# Patient Record
Sex: MALE | Race: WHITE | NOT HISPANIC OR LATINO | Employment: FULL TIME | URBAN - METROPOLITAN AREA
[De-identification: names, ages, dates, MRNs, and addresses within clinical notes are randomized per-mention and may not be internally consistent; named-entity substitution may affect disease eponyms.]

---

## 2023-09-22 ENCOUNTER — HOSPITAL ENCOUNTER (OUTPATIENT)
Dept: DATA CONVERSION | Facility: HOSPITAL | Age: 48
Discharge: HOME | End: 2023-09-22
Payer: COMMERCIAL

## 2023-09-22 DIAGNOSIS — I21.02 ST ELEVATION (STEMI) MYOCARDIAL INFARCTION INVOLVING LEFT ANTERIOR DESCENDING CORONARY ARTERY (MULTI): ICD-10-CM

## 2023-09-22 DIAGNOSIS — Z95.5 PRESENCE OF CORONARY ANGIOPLASTY IMPLANT AND GRAFT: ICD-10-CM

## 2023-09-25 DIAGNOSIS — Z95.5 STENTED CORONARY ARTERY: Primary | ICD-10-CM

## 2023-09-25 DIAGNOSIS — I21.02 ST ELEVATION (STEMI) MYOCARDIAL INFARCTION INVOLVING LEFT ANTERIOR DESCENDING CORONARY ARTERY (MULTI): ICD-10-CM

## 2023-09-28 PROBLEM — E78.5 HYPERLIPIDEMIA: Status: ACTIVE | Noted: 2023-09-28

## 2023-09-28 PROBLEM — I10 ESSENTIAL HYPERTENSION: Status: ACTIVE | Noted: 2023-09-28

## 2023-09-28 PROBLEM — I25.10 CORONARY ARTERY DISEASE: Status: ACTIVE | Noted: 2023-09-28

## 2023-10-02 ENCOUNTER — APPOINTMENT (OUTPATIENT)
Dept: CARDIAC REHAB | Facility: CLINIC | Age: 48
End: 2023-10-02
Payer: COMMERCIAL

## 2023-10-02 ENCOUNTER — CLINICAL SUPPORT (OUTPATIENT)
Dept: CARDIAC REHAB | Facility: CLINIC | Age: 48
End: 2023-10-02
Payer: COMMERCIAL

## 2023-10-02 DIAGNOSIS — Z95.5 STENTED CORONARY ARTERY: ICD-10-CM

## 2023-10-02 DIAGNOSIS — I21.02 ST ELEVATION (STEMI) MYOCARDIAL INFARCTION INVOLVING LEFT ANTERIOR DESCENDING CORONARY ARTERY (MULTI): ICD-10-CM

## 2023-10-02 PROCEDURE — 93798 PHYS/QHP OP CAR RHAB W/ECG: CPT | Performed by: INTERNAL MEDICINE

## 2023-10-04 ENCOUNTER — TRANSCRIBE ORDERS (OUTPATIENT)
Dept: CARDIAC REHAB | Facility: CLINIC | Age: 48
End: 2023-10-04
Payer: COMMERCIAL

## 2023-10-04 ENCOUNTER — CLINICAL SUPPORT (OUTPATIENT)
Dept: CARDIAC REHAB | Facility: CLINIC | Age: 48
End: 2023-10-04
Payer: COMMERCIAL

## 2023-10-04 DIAGNOSIS — I21.01 ST ELEVATION MYOCARDIAL INFARCTION (STEMI) INVOLVING LEFT MAIN CORONARY ARTERY IN RECOVERY PHASE (MULTI): ICD-10-CM

## 2023-10-04 DIAGNOSIS — Z95.5 STENTED CORONARY ARTERY: ICD-10-CM

## 2023-10-04 PROCEDURE — 93798 PHYS/QHP OP CAR RHAB W/ECG: CPT | Performed by: INTERNAL MEDICINE

## 2023-10-06 ENCOUNTER — CLINICAL SUPPORT (OUTPATIENT)
Dept: CARDIAC REHAB | Facility: CLINIC | Age: 48
End: 2023-10-06
Payer: COMMERCIAL

## 2023-10-06 DIAGNOSIS — Z95.5 STENTED CORONARY ARTERY: ICD-10-CM

## 2023-10-06 DIAGNOSIS — I21.01 ST ELEVATION MYOCARDIAL INFARCTION (STEMI) INVOLVING LEFT MAIN CORONARY ARTERY IN RECOVERY PHASE (MULTI): ICD-10-CM

## 2023-10-06 PROCEDURE — 93798 PHYS/QHP OP CAR RHAB W/ECG: CPT | Performed by: INTERNAL MEDICINE

## 2023-10-09 ENCOUNTER — APPOINTMENT (OUTPATIENT)
Dept: CARDIAC REHAB | Facility: CLINIC | Age: 48
End: 2023-10-09
Payer: COMMERCIAL

## 2023-10-11 ENCOUNTER — CLINICAL SUPPORT (OUTPATIENT)
Dept: CARDIAC REHAB | Facility: CLINIC | Age: 48
End: 2023-10-11
Payer: COMMERCIAL

## 2023-10-11 DIAGNOSIS — Z95.5 STENTED CORONARY ARTERY: ICD-10-CM

## 2023-10-11 DIAGNOSIS — I21.01 ST ELEVATION MYOCARDIAL INFARCTION (STEMI) INVOLVING LEFT MAIN CORONARY ARTERY IN RECOVERY PHASE (MULTI): ICD-10-CM

## 2023-10-11 PROCEDURE — 93798 PHYS/QHP OP CAR RHAB W/ECG: CPT | Performed by: INTERNAL MEDICINE

## 2023-10-13 ENCOUNTER — CLINICAL SUPPORT (OUTPATIENT)
Dept: CARDIAC REHAB | Facility: CLINIC | Age: 48
End: 2023-10-13
Payer: COMMERCIAL

## 2023-10-13 DIAGNOSIS — Z95.5 STENTED CORONARY ARTERY: ICD-10-CM

## 2023-10-13 DIAGNOSIS — I21.01 ST ELEVATION MYOCARDIAL INFARCTION (STEMI) INVOLVING LEFT MAIN CORONARY ARTERY IN RECOVERY PHASE (MULTI): ICD-10-CM

## 2023-10-13 PROCEDURE — 93798 PHYS/QHP OP CAR RHAB W/ECG: CPT | Performed by: INTERNAL MEDICINE

## 2023-10-16 ENCOUNTER — CLINICAL SUPPORT (OUTPATIENT)
Dept: CARDIAC REHAB | Facility: CLINIC | Age: 48
End: 2023-10-16
Payer: COMMERCIAL

## 2023-10-16 DIAGNOSIS — I21.01 ST ELEVATION MYOCARDIAL INFARCTION (STEMI) INVOLVING LEFT MAIN CORONARY ARTERY IN RECOVERY PHASE (MULTI): ICD-10-CM

## 2023-10-16 DIAGNOSIS — Z95.5 STENTED CORONARY ARTERY: ICD-10-CM

## 2023-10-16 NOTE — PROGRESS NOTES
Cardiac Rehabilitation Initial Assessment (iITP)    Name: Henrry Burdick  Medical Record Number: [id number]  YOB: 1975    Today’s Date: 10/16/2023  Primary Care Physician: Henrry Duncan DO  Referring Physician: ***    General  1. ST elevation myocardial infarction (STEMI) involving left main coronary artery in recovery phase (CMS/HCC)  Follow Up In Cardiac Rehab      2. Stented coronary artery  Follow Up In Cardiac Rehab          AACVPR Risk Stratification:{AACVPR Risk Stratification:86307}    Medical History  No past medical history on file.  No past surgical history on file.  Allergies: Not on File    Psychosocial Assessment  Patient is returning for a follow up visit after reporting minimal to mild depressive symptoms.     Please document a new PHQ-9 score.    Pt reported/currently experiencing: {Yes/No:28362}  Currently seeing a mental health provider: {Yes/No:48773}  Social Support: {Yes/No:65473}    Learning assessment/barriers: {Assessment/barriers:68288}  Preferred learning method: {Preferred learning method:14955}   Quality of Life Survey:{Quality of Life Survey:40145}    Educational Assessment:  Cardiac Knowledge Test: ***/15    Stages of Change:{Stages of change:72498}    Psychosocial Goals: ***  Psychosocial Interventions/Education: ***        Nutrition Assessment:    Hyperlipidemia: {Yes/No:36043} ***    Lipids: ***  Lipid Draw Date: ***    Current Dietary Guidelines:{Dietary Guidelines:14107}  Barriers to dietary change: {Yes/No:62036} ***    Diet Habit Survey score: ***    Diabetes Assessment    Diabetes:{Yes or No:46419}   Medication: {Medication:84418}  Last Hemoglobin A1C: ***   Last Hemoglobin A1C date: ***  Pt monitors BS at home: {Yes/No:30729}  Frequency: ***  FBS range: ***  Hypoglycemic Episodes: {Yes/No:89060}    Weight Management:    There were no vitals filed for this visit.  There is no height or weight on file to calculate BMI.    Nutrition Goals: ***  Nutrition  Interventions/Education: ***    Exercise Assessment:  Current Home Exercise: {Yes/No:33323}  Mode: ***  Days/Week: ***  Min/Day: ***    Exercise Prescription:    Based on: {{Exercise Prescription:40981}   Frequency:  *** days/week   Mode: {Mode:95439}   Duration: *** total aerobic minutes   Intensity: RPE ***       Target HR ***       METS ***       SpO2 Range *** %       O2 Flow Rate *** L/min     Modality METS Load Duration   1 Pre-Exercise *** *** ***   2 Treadmill *** *** *** ***   3 Nustep 4000 *** *** *** ***   4 Recumbent Cycle *** *** *** ***   5 Weights *** *** *** ***   6 Post-Exercise *** *** ***     Exercise Goals: ***  Exercise Interventions/Education: ***    Other Core Components/Risk Factor Assessment:    Medication adherence:   Current Medications:   Current Outpatient Medications   Medication Sig Dispense Refill    aspirin 81 mg EC tablet Take 1 tablet (81 mg) by mouth once daily.      atorvastatin (Lipitor) 80 mg tablet Take 1 tablet (80 mg) by mouth once daily.      lisinopril 5 mg tablet Take 1 tablet (5 mg) by mouth once daily.      metoprolol succinate XL (Toprol-XL) 25 mg 24 hr tablet Take 1 tablet (25 mg) by mouth once daily.      nitroglycerin (Nitrostat) 0.4 mg SL tablet Place 1 tablet (0.4 mg) under the tongue every 5 minutes if needed for chest pain.      nitroglycerin (Nitrostat) 0.4 mg SL tablet DISSOLVE 1 TABLET UNDER THE TONGUE EVERY 5 MINUTES UP TO 3 DOSES AS NEEDED FOR CHEST PAIN. IF STILL NO RELIEF, CALL 911 25 tablet 0    ticagrelor (Brilinta) 90 mg tablet Take 1 tablet (90 mg) by mouth 2 times a day.       No current facility-administered medications for this visit.                 Taking medications as prescribed: {Yes/No:89151}   If no, why: ***   Uses pill box/organizer: {Yes/No:88331}   Carries medication list: {Yes/No:05724}    Blood Pressure Management:  Hx of Hypertension: {Yes/No:66775}  Resting BP: Growth %ile SmartLinks can only be used for patients less than 20 years  old.     Heart Failure Management:  Hx of Heart Failure:{Yes/No:52166}  Type (selection): {Type (selection):79534}  Most recent EF: @LVEF%@  Date:***  Onset of heart failure diagnosis: ***  Last heart failure hospitalization: ***  Number of HF admissions per year: ***  Symptoms:{Symptoms:11156}  Is there a family Hx of HF:{Yes/No:91434}  Does patient obtain daily weight {Yes/No:85887}    Smoking/Tobacco Assessment:    Tobacco Use: Not on file       Other Core Component Goals: ***  Other Core Component Interventions/Education: ***       Individual Patient Goals:  ***      [facility]ha[facility] Staff Signature: Inga Troy MD Review Signature: ***

## 2023-10-18 ENCOUNTER — CLINICAL SUPPORT (OUTPATIENT)
Dept: CARDIAC REHAB | Facility: CLINIC | Age: 48
End: 2023-10-18
Payer: COMMERCIAL

## 2023-10-18 DIAGNOSIS — I21.01 ST ELEVATION MYOCARDIAL INFARCTION (STEMI) INVOLVING LEFT MAIN CORONARY ARTERY IN RECOVERY PHASE (MULTI): ICD-10-CM

## 2023-10-18 DIAGNOSIS — Z95.5 STENTED CORONARY ARTERY: ICD-10-CM

## 2023-10-18 PROCEDURE — 93798 PHYS/QHP OP CAR RHAB W/ECG: CPT | Performed by: INTERNAL MEDICINE

## 2023-10-20 ENCOUNTER — CLINICAL SUPPORT (OUTPATIENT)
Dept: CARDIAC REHAB | Facility: CLINIC | Age: 48
End: 2023-10-20
Payer: COMMERCIAL

## 2023-10-20 DIAGNOSIS — I21.01 ST ELEVATION MYOCARDIAL INFARCTION (STEMI) INVOLVING LEFT MAIN CORONARY ARTERY IN RECOVERY PHASE (MULTI): ICD-10-CM

## 2023-10-20 DIAGNOSIS — Z95.5 STENTED CORONARY ARTERY: ICD-10-CM

## 2023-10-20 PROCEDURE — 93798 PHYS/QHP OP CAR RHAB W/ECG: CPT | Performed by: INTERNAL MEDICINE

## 2023-10-23 ENCOUNTER — CLINICAL SUPPORT (OUTPATIENT)
Dept: CARDIAC REHAB | Facility: CLINIC | Age: 48
End: 2023-10-23
Payer: COMMERCIAL

## 2023-10-23 DIAGNOSIS — I21.01 ST ELEVATION MYOCARDIAL INFARCTION (STEMI) INVOLVING LEFT MAIN CORONARY ARTERY IN RECOVERY PHASE (MULTI): ICD-10-CM

## 2023-10-23 DIAGNOSIS — Z95.5 STENTED CORONARY ARTERY: ICD-10-CM

## 2023-10-23 PROCEDURE — 93798 PHYS/QHP OP CAR RHAB W/ECG: CPT | Performed by: INTERNAL MEDICINE

## 2023-10-25 ENCOUNTER — CLINICAL SUPPORT (OUTPATIENT)
Dept: CARDIAC REHAB | Facility: CLINIC | Age: 48
End: 2023-10-25
Payer: COMMERCIAL

## 2023-10-25 DIAGNOSIS — Z95.5 STENTED CORONARY ARTERY: ICD-10-CM

## 2023-10-25 DIAGNOSIS — I21.01 ST ELEVATION MYOCARDIAL INFARCTION (STEMI) INVOLVING LEFT MAIN CORONARY ARTERY IN RECOVERY PHASE (MULTI): ICD-10-CM

## 2023-10-25 PROCEDURE — 93798 PHYS/QHP OP CAR RHAB W/ECG: CPT | Performed by: INTERNAL MEDICINE

## 2023-10-27 ENCOUNTER — CLINICAL SUPPORT (OUTPATIENT)
Dept: CARDIAC REHAB | Facility: CLINIC | Age: 48
End: 2023-10-27
Payer: COMMERCIAL

## 2023-10-27 DIAGNOSIS — I21.01 ST ELEVATION MYOCARDIAL INFARCTION (STEMI) INVOLVING LEFT MAIN CORONARY ARTERY IN RECOVERY PHASE (MULTI): ICD-10-CM

## 2023-10-27 DIAGNOSIS — Z95.5 STENTED CORONARY ARTERY: ICD-10-CM

## 2023-10-28 PROBLEM — Z78.9 MEDICAL HOME PATIENT: Status: ACTIVE | Noted: 2023-10-28

## 2023-10-28 PROBLEM — E34.9 HYPOTESTOSTERONISM: Status: ACTIVE | Noted: 2023-10-28

## 2023-10-28 PROBLEM — E55.9 VITAMIN D DEFICIENCY: Status: ACTIVE | Noted: 2017-09-09

## 2023-10-28 PROBLEM — I21.9 ACUTE MI (MULTI): Status: ACTIVE | Noted: 2023-10-28

## 2023-10-28 PROBLEM — F32.A DEPRESSION: Status: ACTIVE | Noted: 2023-10-28

## 2023-10-28 PROBLEM — F43.21 GRIEF: Status: ACTIVE | Noted: 2023-10-28

## 2023-10-28 PROBLEM — Q74.0 THUMB ANOMALY: Status: ACTIVE | Noted: 2023-10-28

## 2023-10-28 PROBLEM — N52.9 ERECTILE DYSFUNCTION: Status: ACTIVE | Noted: 2023-10-28

## 2023-10-28 PROBLEM — F41.9 ANXIETY: Status: ACTIVE | Noted: 2023-10-28

## 2023-10-28 PROBLEM — G47.00 INSOMNIA: Status: ACTIVE | Noted: 2023-10-28

## 2023-10-28 PROBLEM — S61.219A LACERATION OF FINGER: Status: ACTIVE | Noted: 2023-10-28

## 2023-10-28 PROBLEM — I20.9 ANGINA PECTORIS (CMS-HCC): Status: ACTIVE | Noted: 2023-10-28

## 2023-10-28 PROBLEM — Z91.89 AT RISK FOR BLEEDING: Status: ACTIVE | Noted: 2023-10-28

## 2023-10-28 PROBLEM — M67.90 DISORDER OF SYNOVIUM: Status: ACTIVE | Noted: 2023-10-28

## 2023-10-28 RX ORDER — DESVENLAFAXINE 50 MG/1
50 TABLET, EXTENDED RELEASE ORAL DAILY
COMMUNITY
Start: 2021-02-23 | End: 2023-10-31 | Stop reason: ALTCHOICE

## 2023-10-28 RX ORDER — ALBUTEROL SULFATE 90 UG/1
2 AEROSOL, METERED RESPIRATORY (INHALATION) EVERY 6 HOURS PRN
COMMUNITY
End: 2023-10-31 | Stop reason: ALTCHOICE

## 2023-10-28 RX ORDER — LORAZEPAM 0.5 MG/1
0.5 TABLET ORAL DAILY
COMMUNITY
Start: 2021-03-23 | End: 2023-10-31 | Stop reason: ALTCHOICE

## 2023-10-30 ENCOUNTER — CLINICAL SUPPORT (OUTPATIENT)
Dept: CARDIAC REHAB | Facility: CLINIC | Age: 48
End: 2023-10-30
Payer: COMMERCIAL

## 2023-10-30 DIAGNOSIS — I21.01 ST ELEVATION MYOCARDIAL INFARCTION (STEMI) INVOLVING LEFT MAIN CORONARY ARTERY IN RECOVERY PHASE (MULTI): ICD-10-CM

## 2023-10-30 DIAGNOSIS — Z95.5 STENTED CORONARY ARTERY: ICD-10-CM

## 2023-10-30 PROCEDURE — 93798 PHYS/QHP OP CAR RHAB W/ECG: CPT | Performed by: INTERNAL MEDICINE

## 2023-10-30 RX ORDER — NAPROXEN SODIUM 220 MG/1
81 TABLET, FILM COATED ORAL DAILY
COMMUNITY
Start: 2023-10-28 | End: 2023-10-31 | Stop reason: SDUPTHER

## 2023-10-31 ENCOUNTER — OFFICE VISIT (OUTPATIENT)
Dept: CARDIOLOGY | Facility: CLINIC | Age: 48
End: 2023-10-31
Payer: COMMERCIAL

## 2023-10-31 VITALS
BODY MASS INDEX: 32.99 KG/M2 | RESPIRATION RATE: 18 BRPM | SYSTOLIC BLOOD PRESSURE: 112 MMHG | OXYGEN SATURATION: 98 % | DIASTOLIC BLOOD PRESSURE: 63 MMHG | HEART RATE: 68 BPM | HEIGHT: 65 IN | WEIGHT: 198 LBS

## 2023-10-31 DIAGNOSIS — E78.2 MIXED HYPERLIPIDEMIA: ICD-10-CM

## 2023-10-31 DIAGNOSIS — I10 PRIMARY HYPERTENSION: ICD-10-CM

## 2023-10-31 DIAGNOSIS — I50.20 SYSTOLIC CONGESTIVE HEART FAILURE, UNSPECIFIED HF CHRONICITY (MULTI): ICD-10-CM

## 2023-10-31 DIAGNOSIS — I20.9 ANGINA PECTORIS (CMS-HCC): Primary | ICD-10-CM

## 2023-10-31 PROCEDURE — 93000 ELECTROCARDIOGRAM COMPLETE: CPT | Performed by: INTERNAL MEDICINE

## 2023-10-31 PROCEDURE — 99214 OFFICE O/P EST MOD 30 MIN: CPT | Performed by: INTERNAL MEDICINE

## 2023-10-31 PROCEDURE — 3074F SYST BP LT 130 MM HG: CPT | Performed by: INTERNAL MEDICINE

## 2023-10-31 PROCEDURE — 3078F DIAST BP <80 MM HG: CPT | Performed by: INTERNAL MEDICINE

## 2023-10-31 RX ORDER — HYDROGEN PEROXIDE 3 %
20 SOLUTION, NON-ORAL MISCELLANEOUS DAILY PRN
COMMUNITY

## 2023-10-31 RX ORDER — LOSARTAN POTASSIUM 25 MG/1
25 TABLET ORAL DAILY
Qty: 30 TABLET | Refills: 11 | Status: SHIPPED | OUTPATIENT
Start: 2023-10-31 | End: 2024-10-30

## 2023-10-31 RX ORDER — OMEPRAZOLE 40 MG/1
40 CAPSULE, DELAYED RELEASE ORAL DAILY PRN
COMMUNITY
End: 2024-01-24 | Stop reason: SDUPTHER

## 2023-10-31 RX ORDER — LISINOPRIL 5 MG/1
5 TABLET ORAL DAILY
COMMUNITY
End: 2023-10-31

## 2023-10-31 RX ORDER — ROSUVASTATIN CALCIUM 20 MG/1
20 TABLET, COATED ORAL DAILY
Qty: 90 TABLET | Refills: 3 | Status: SHIPPED | OUTPATIENT
Start: 2023-10-31 | End: 2024-10-30

## 2023-10-31 RX ORDER — METOPROLOL TARTRATE 25 MG/1
12.5 TABLET, FILM COATED ORAL 2 TIMES DAILY
COMMUNITY

## 2023-10-31 ASSESSMENT — PATIENT HEALTH QUESTIONNAIRE - PHQ9
1. LITTLE INTEREST OR PLEASURE IN DOING THINGS: NOT AT ALL
2. FEELING DOWN, DEPRESSED OR HOPELESS: NOT AT ALL
SUM OF ALL RESPONSES TO PHQ9 QUESTIONS 1 AND 2: 0

## 2023-10-31 ASSESSMENT — PAIN SCALES - GENERAL: PAINLEVEL: 0-NO PAIN

## 2023-10-31 NOTE — PROGRESS NOTES
History of present illness:    This is a very pleasant 48-year-old male returns to my office after hospitalization for anterior STEMI status post PCI to % occluded with excellent result in August 2023. EF of 35 to 40%.  Patient returns to my office for follow-up.  For the last month or so has been feeling tired overall no chest pain or shortness of breath with activity but tired around 2 or 3 PM he is tired at home he needs to take naps.  Having very infrequent episodes of lower extremity edema very mild.  Tolerating rehab good.     Past Medical History:   Diagnosis Date    Acute MI (CMS/[facility]) 10/28/2023    Angina pectoris (CMS/[facility]) 10/28/2023    Coronary artery disease 09/28/2023    Essential hypertension 09/28/2023    Hyperlipidemia 09/28/2023    S/P cardiac cath     with [employer] [place] [facility] Eliting stent       Past Surgical History:   Procedure Laterality Date    CORONARY ANGIOPLASTY WITH STENT PLACEMENT  07/2023       No Known Allergies     reports that he has been smoking cigarettes. He has been smoking an average of .5 packs per day. He has been exposed to tobacco smoke. He has never used smokeless tobacco. He reports current alcohol use. He reports that he does not use drugs.    Family History   Problem Relation Name Age of Onset    Hypertension Mother      Heart disease Father      Heart attack Father         Patient's Medications   New Prescriptions    No medications on file   Previous Medications    ASPIRIN 81 MG EC TABLET    Take 1 tablet (81 mg) by mouth once daily.    ATORVASTATIN (LIPITOR) 80 MG TABLET    Take 1 tablet (80 mg) by mouth once daily.    ESOMEPRAZOLE (NEXIUM) 20 MG DR CAPSULE    Take 1 capsule (20 mg) by mouth once daily as needed. Do not open capsule.    LISINOPRIL 5 MG TABLET    Take 1 tablet (5 mg) by mouth once daily.    METOPROLOL TARTRATE (LOPRESSOR) 25 MG TABLET    Take 0.5 tablets (12.5 mg) by mouth 2 times a day.    NITROGLYCERIN (NITROSTAT) 0.4 MG SL TABLET     DISSOLVE 1 TABLET UNDER THE TONGUE EVERY 5 MINUTES UP TO 3 DOSES AS NEEDED FOR CHEST PAIN. IF STILL NO RELIEF, CALL 911    OMEPRAZOLE (PRILOSEC) 40 MG DR CAPSULE    Take 1 capsule (40 mg) by mouth once daily as needed. Do not crush or chew.    TICAGRELOR (BRILINTA) 90 MG TABLET    Take 1 tablet (90 mg) by mouth 2 times a day.   Modified Medications    No medications on file   Discontinued Medications    ALBUTEROL 90 MCG/ACTUATION INHALER    Inhale 2 puffs every 6 hours if needed.    ASPIRIN 81 MG CHEWABLE TABLET    Chew 1 tablet (81 mg) once daily.    DESVENLAFAXINE 50 MG 24 HR TABLET    Take 1 tablet (50 mg) by mouth once daily.    LISINOPRIL 5 MG TABLET    Take 1 tablet (5 mg) by mouth once daily.    LORAZEPAM (ATIVAN) 0.5 MG TABLET    Take 1 tablet (0.5 mg) by mouth once daily.    METOPROLOL SUCCINATE XL (TOPROL-XL) 25 MG 24 HR TABLET    Take 1 tablet (25 mg) by mouth once daily.    NITROGLYCERIN (NITROSTAT) 0.4 MG SL TABLET    Place 1 tablet (0.4 mg) under the tongue every 5 minutes if needed for chest pain.       Objective   Physical Exam  General: Patient in no acute distress   HEENT: Atraumatic normocephalic.  Neck: Supple, jugular venous pressure within normal limit.  No bruits  Lungs: Clear to auscultation bilaterally  Cardiovascular: Regular rate and rhythm, normal heart sounds, no murmurs rubs or gallops  Abdomen: Soft nontender nondistended.  Normal bowel sounds.  Extremities: Warm to touch, no edema.      Lab Review   No visits with results within 2 Month(s) from this visit.   Latest known visit with results is:   No results found for any previous visit.        Assessment/Plan   Patient Active Problem List   Diagnosis    Coronary artery disease    Essential hypertension    Hyperlipidemia    At risk for bleeding    Medical home patient    Angina pectoris (CMS/HCC)    Acute MI (CMS/HCC)    Anxiety    Depression    Disorder of synovium    Erectile dysfunction    Grief    Hypotestosteronism    Insomnia     Laceration of finger    Thumb anomaly    Vitamin D deficiency      This is a very pleasant 48-year-old male returns to my office after hospitalization for anterior STEMI status post PCI to % occluded with excellent result in August 2023. EF of 35 to 40%.  Patient returns to my office for follow-up.  For the last month or so has been feeling tired overall no chest pain or shortness of breath with activity but tired around 2 or 3 PM he is tired at home he needs to take naps.  Having very infrequent episodes of lower extremity edema very mild.  Tolerating rehab good.  At this point I will make some adjustments to his medications.  He has been having diarrhea with atorvastatin we will change it to rosuvastatin 20 mg oral daily.  We will stop lisinopril due to cough and put him on losartan 25 mg oral daily.  We will obtain 2D echo and blood work-up as work-up for fatigue shortness of breath.  Will make sure his ejection fraction is stable.  EKG today showed normal sinus rhythm no QRS voltage.  We will follow-up in 2 months.    Virgilio Miner MD

## 2023-11-03 ENCOUNTER — CLINICAL SUPPORT (OUTPATIENT)
Dept: CARDIAC REHAB | Facility: CLINIC | Age: 48
End: 2023-11-03
Payer: COMMERCIAL

## 2023-11-03 DIAGNOSIS — I21.01 ST ELEVATION MYOCARDIAL INFARCTION (STEMI) INVOLVING LEFT MAIN CORONARY ARTERY IN RECOVERY PHASE (MULTI): ICD-10-CM

## 2023-11-03 DIAGNOSIS — Z95.5 STENTED CORONARY ARTERY: ICD-10-CM

## 2023-11-06 ENCOUNTER — CLINICAL SUPPORT (OUTPATIENT)
Dept: CARDIAC REHAB | Facility: CLINIC | Age: 48
End: 2023-11-06
Payer: COMMERCIAL

## 2023-11-06 DIAGNOSIS — I21.01 ST ELEVATION MYOCARDIAL INFARCTION (STEMI) INVOLVING LEFT MAIN CORONARY ARTERY IN RECOVERY PHASE (MULTI): ICD-10-CM

## 2023-11-06 DIAGNOSIS — Z95.5 STENTED CORONARY ARTERY: ICD-10-CM

## 2023-11-08 ENCOUNTER — CLINICAL SUPPORT (OUTPATIENT)
Dept: CARDIAC REHAB | Facility: CLINIC | Age: 48
End: 2023-11-08
Payer: COMMERCIAL

## 2023-11-08 DIAGNOSIS — I21.01 ST ELEVATION MYOCARDIAL INFARCTION (STEMI) INVOLVING LEFT MAIN CORONARY ARTERY IN RECOVERY PHASE (MULTI): ICD-10-CM

## 2023-11-08 DIAGNOSIS — Z95.5 STENTED CORONARY ARTERY: ICD-10-CM

## 2023-11-10 ENCOUNTER — APPOINTMENT (OUTPATIENT)
Dept: CARDIAC REHAB | Facility: CLINIC | Age: 48
End: 2023-11-10
Payer: COMMERCIAL

## 2023-11-13 ENCOUNTER — CLINICAL SUPPORT (OUTPATIENT)
Dept: CARDIAC REHAB | Facility: CLINIC | Age: 48
End: 2023-11-13
Payer: COMMERCIAL

## 2023-11-13 DIAGNOSIS — I21.01 ST ELEVATION MYOCARDIAL INFARCTION (STEMI) INVOLVING LEFT MAIN CORONARY ARTERY IN RECOVERY PHASE (MULTI): ICD-10-CM

## 2023-11-13 DIAGNOSIS — Z95.5 STENTED CORONARY ARTERY: ICD-10-CM

## 2023-11-15 ENCOUNTER — APPOINTMENT (OUTPATIENT)
Dept: CARDIAC REHAB | Facility: CLINIC | Age: 48
End: 2023-11-15
Payer: COMMERCIAL

## 2023-11-17 ENCOUNTER — APPOINTMENT (OUTPATIENT)
Dept: CARDIAC REHAB | Facility: CLINIC | Age: 48
End: 2023-11-17
Payer: COMMERCIAL

## 2023-11-20 ENCOUNTER — CLINICAL SUPPORT (OUTPATIENT)
Dept: CARDIAC REHAB | Facility: CLINIC | Age: 48
End: 2023-11-20
Payer: COMMERCIAL

## 2023-11-20 DIAGNOSIS — Z95.5 STENTED CORONARY ARTERY: ICD-10-CM

## 2023-11-20 DIAGNOSIS — I21.01 ST ELEVATION MYOCARDIAL INFARCTION (STEMI) INVOLVING LEFT MAIN CORONARY ARTERY IN RECOVERY PHASE (MULTI): ICD-10-CM

## 2023-11-22 ENCOUNTER — APPOINTMENT (OUTPATIENT)
Dept: CARDIAC REHAB | Facility: CLINIC | Age: 48
End: 2023-11-22
Payer: COMMERCIAL

## 2023-11-24 ENCOUNTER — APPOINTMENT (OUTPATIENT)
Dept: CARDIAC REHAB | Facility: CLINIC | Age: 48
End: 2023-11-24
Payer: COMMERCIAL

## 2023-11-27 ENCOUNTER — CLINICAL SUPPORT (OUTPATIENT)
Dept: CARDIAC REHAB | Facility: CLINIC | Age: 48
End: 2023-11-27
Payer: COMMERCIAL

## 2023-11-27 DIAGNOSIS — I21.01 ST ELEVATION MYOCARDIAL INFARCTION (STEMI) INVOLVING LEFT MAIN CORONARY ARTERY IN RECOVERY PHASE (MULTI): ICD-10-CM

## 2023-11-27 DIAGNOSIS — Z95.5 STENTED CORONARY ARTERY: ICD-10-CM

## 2023-11-27 PROCEDURE — 93798 PHYS/QHP OP CAR RHAB W/ECG: CPT | Performed by: INTERNAL MEDICINE

## 2023-11-29 ENCOUNTER — CLINICAL SUPPORT (OUTPATIENT)
Dept: CARDIAC REHAB | Facility: CLINIC | Age: 48
End: 2023-11-29
Payer: COMMERCIAL

## 2023-11-29 DIAGNOSIS — Z95.5 STENTED CORONARY ARTERY: ICD-10-CM

## 2023-11-29 DIAGNOSIS — I21.01 ST ELEVATION MYOCARDIAL INFARCTION (STEMI) INVOLVING LEFT MAIN CORONARY ARTERY IN RECOVERY PHASE (MULTI): ICD-10-CM

## 2023-11-29 PROCEDURE — 93798 PHYS/QHP OP CAR RHAB W/ECG: CPT | Performed by: INTERNAL MEDICINE

## 2023-12-01 ENCOUNTER — CLINICAL SUPPORT (OUTPATIENT)
Dept: CARDIAC REHAB | Facility: CLINIC | Age: 48
End: 2023-12-01
Payer: COMMERCIAL

## 2023-12-01 DIAGNOSIS — I21.01 ST ELEVATION MYOCARDIAL INFARCTION (STEMI) INVOLVING LEFT MAIN CORONARY ARTERY IN RECOVERY PHASE (MULTI): ICD-10-CM

## 2023-12-01 DIAGNOSIS — Z95.5 STENTED CORONARY ARTERY: ICD-10-CM

## 2023-12-04 ENCOUNTER — CLINICAL SUPPORT (OUTPATIENT)
Dept: CARDIAC REHAB | Facility: CLINIC | Age: 48
End: 2023-12-04
Payer: COMMERCIAL

## 2023-12-04 DIAGNOSIS — Z95.5 STENTED CORONARY ARTERY: ICD-10-CM

## 2023-12-04 DIAGNOSIS — I21.01 ST ELEVATION MYOCARDIAL INFARCTION (STEMI) INVOLVING LEFT MAIN CORONARY ARTERY IN RECOVERY PHASE (MULTI): ICD-10-CM

## 2023-12-04 PROCEDURE — 93798 PHYS/QHP OP CAR RHAB W/ECG: CPT | Performed by: INTERNAL MEDICINE

## 2023-12-06 ENCOUNTER — CLINICAL SUPPORT (OUTPATIENT)
Dept: CARDIAC REHAB | Facility: CLINIC | Age: 48
End: 2023-12-06
Payer: COMMERCIAL

## 2023-12-06 DIAGNOSIS — Z95.5 STENTED CORONARY ARTERY: ICD-10-CM

## 2023-12-06 DIAGNOSIS — I21.01 ST ELEVATION MYOCARDIAL INFARCTION (STEMI) INVOLVING LEFT MAIN CORONARY ARTERY IN RECOVERY PHASE (MULTI): ICD-10-CM

## 2023-12-08 ENCOUNTER — CLINICAL SUPPORT (OUTPATIENT)
Dept: CARDIAC REHAB | Facility: CLINIC | Age: 48
End: 2023-12-08
Payer: COMMERCIAL

## 2023-12-08 DIAGNOSIS — I21.01 ST ELEVATION MYOCARDIAL INFARCTION (STEMI) INVOLVING LEFT MAIN CORONARY ARTERY IN RECOVERY PHASE (MULTI): ICD-10-CM

## 2023-12-08 DIAGNOSIS — Z95.5 STENTED CORONARY ARTERY: ICD-10-CM

## 2023-12-11 ENCOUNTER — CLINICAL SUPPORT (OUTPATIENT)
Dept: CARDIAC REHAB | Facility: CLINIC | Age: 48
End: 2023-12-11
Payer: COMMERCIAL

## 2023-12-11 DIAGNOSIS — I21.01 ST ELEVATION MYOCARDIAL INFARCTION (STEMI) INVOLVING LEFT MAIN CORONARY ARTERY IN RECOVERY PHASE (MULTI): ICD-10-CM

## 2023-12-11 DIAGNOSIS — Z95.5 STENTED CORONARY ARTERY: ICD-10-CM

## 2023-12-11 PROCEDURE — 93798 PHYS/QHP OP CAR RHAB W/ECG: CPT | Performed by: INTERNAL MEDICINE

## 2023-12-13 ENCOUNTER — CLINICAL SUPPORT (OUTPATIENT)
Dept: CARDIAC REHAB | Facility: CLINIC | Age: 48
End: 2023-12-13
Payer: COMMERCIAL

## 2023-12-13 DIAGNOSIS — I21.01 ST ELEVATION MYOCARDIAL INFARCTION (STEMI) INVOLVING LEFT MAIN CORONARY ARTERY IN RECOVERY PHASE (MULTI): ICD-10-CM

## 2023-12-13 DIAGNOSIS — Z95.5 STENTED CORONARY ARTERY: ICD-10-CM

## 2023-12-13 PROCEDURE — 93798 PHYS/QHP OP CAR RHAB W/ECG: CPT | Performed by: INTERNAL MEDICINE

## 2023-12-18 ENCOUNTER — APPOINTMENT (OUTPATIENT)
Dept: CARDIAC REHAB | Facility: CLINIC | Age: 48
End: 2023-12-18
Payer: COMMERCIAL

## 2023-12-20 ENCOUNTER — APPOINTMENT (OUTPATIENT)
Dept: CARDIAC REHAB | Facility: CLINIC | Age: 48
End: 2023-12-20
Payer: COMMERCIAL

## 2023-12-22 ENCOUNTER — APPOINTMENT (OUTPATIENT)
Dept: CARDIAC REHAB | Facility: CLINIC | Age: 48
End: 2023-12-22
Payer: COMMERCIAL

## 2023-12-26 RX ORDER — METOPROLOL SUCCINATE 25 MG/1
TABLET, EXTENDED RELEASE ORAL
COMMUNITY
Start: 2023-12-14 | End: 2024-01-24 | Stop reason: SDUPTHER

## 2023-12-27 ENCOUNTER — APPOINTMENT (OUTPATIENT)
Dept: CARDIAC REHAB | Facility: CLINIC | Age: 48
End: 2023-12-27
Payer: COMMERCIAL

## 2023-12-29 ENCOUNTER — APPOINTMENT (OUTPATIENT)
Dept: CARDIAC REHAB | Facility: CLINIC | Age: 48
End: 2023-12-29
Payer: COMMERCIAL

## 2024-01-03 ENCOUNTER — APPOINTMENT (OUTPATIENT)
Dept: CARDIAC REHAB | Facility: CLINIC | Age: 49
End: 2024-01-03
Payer: COMMERCIAL

## 2024-01-05 ENCOUNTER — APPOINTMENT (OUTPATIENT)
Dept: CARDIAC REHAB | Facility: CLINIC | Age: 49
End: 2024-01-05
Payer: COMMERCIAL

## 2024-01-08 ENCOUNTER — APPOINTMENT (OUTPATIENT)
Dept: CARDIAC REHAB | Facility: CLINIC | Age: 49
End: 2024-01-08
Payer: COMMERCIAL

## 2024-01-09 ENCOUNTER — APPOINTMENT (OUTPATIENT)
Dept: CARDIOLOGY | Facility: CLINIC | Age: 49
End: 2024-01-09
Payer: COMMERCIAL

## 2024-01-24 ENCOUNTER — OFFICE VISIT (OUTPATIENT)
Dept: CARDIOLOGY | Facility: CLINIC | Age: 49
End: 2024-01-24
Payer: COMMERCIAL

## 2024-01-24 VITALS
TEMPERATURE: 98.9 F | BODY MASS INDEX: 34.99 KG/M2 | WEIGHT: 210 LBS | HEART RATE: 73 BPM | SYSTOLIC BLOOD PRESSURE: 108 MMHG | HEIGHT: 65 IN | RESPIRATION RATE: 18 BRPM | DIASTOLIC BLOOD PRESSURE: 94 MMHG | OXYGEN SATURATION: 96 %

## 2024-01-24 DIAGNOSIS — E78.2 MIXED HYPERLIPIDEMIA: ICD-10-CM

## 2024-01-24 DIAGNOSIS — I25.10 CORONARY ARTERY DISEASE INVOLVING NATIVE CORONARY ARTERY OF NATIVE HEART WITHOUT ANGINA PECTORIS: Primary | ICD-10-CM

## 2024-01-24 DIAGNOSIS — I10 ESSENTIAL HYPERTENSION: ICD-10-CM

## 2024-01-24 DIAGNOSIS — I20.9 ANGINA PECTORIS (CMS-HCC): ICD-10-CM

## 2024-01-24 PROCEDURE — 3074F SYST BP LT 130 MM HG: CPT | Performed by: INTERNAL MEDICINE

## 2024-01-24 PROCEDURE — 99214 OFFICE O/P EST MOD 30 MIN: CPT | Performed by: INTERNAL MEDICINE

## 2024-01-24 PROCEDURE — 3080F DIAST BP >= 90 MM HG: CPT | Performed by: INTERNAL MEDICINE

## 2024-01-24 ASSESSMENT — PATIENT HEALTH QUESTIONNAIRE - PHQ9
3. TROUBLE FALLING OR STAYING ASLEEP OR SLEEPING TOO MUCH: NOT AT ALL
9. THOUGHTS THAT YOU WOULD BE BETTER OFF DEAD, OR OF HURTING YOURSELF: NOT AT ALL
4. FEELING TIRED OR HAVING LITTLE ENERGY: NEARLY EVERY DAY
1. LITTLE INTEREST OR PLEASURE IN DOING THINGS: NEARLY EVERY DAY
10. IF YOU CHECKED OFF ANY PROBLEMS, HOW DIFFICULT HAVE THESE PROBLEMS MADE IT FOR YOU TO DO YOUR WORK, TAKE CARE OF THINGS AT HOME, OR GET ALONG WITH OTHER PEOPLE: EXTREMELY DIFFICULT
7. TROUBLE CONCENTRATING ON THINGS, SUCH AS READING THE NEWSPAPER OR WATCHING TELEVISION: NOT AT ALL
2. FEELING DOWN, DEPRESSED OR HOPELESS: NEARLY EVERY DAY
8. MOVING OR SPEAKING SO SLOWLY THAT OTHER PEOPLE COULD HAVE NOTICED. OR THE OPPOSITE, BEING SO FIGETY OR RESTLESS THAT YOU HAVE BEEN MOVING AROUND A LOT MORE THAN USUAL: NOT AT ALL
SUM OF ALL RESPONSES TO PHQ QUESTIONS 1-9: 12
5. POOR APPETITE OR OVEREATING: NEARLY EVERY DAY
6. FEELING BAD ABOUT YOURSELF - OR THAT YOU ARE A FAILURE OR HAVE LET YOURSELF OR YOUR FAMILY DOWN: NOT AT ALL
SUM OF ALL RESPONSES TO PHQ9 QUESTIONS 1 AND 2: 6

## 2024-01-24 ASSESSMENT — PAIN SCALES - GENERAL: PAINLEVEL: 0-NO PAIN

## 2024-01-24 NOTE — PROGRESS NOTES
History of present illness:  This is a very pleasant 48-year-old male returns to my office after hospitalization for anterior STEMI status post PCI to % occluded with excellent result in August 2023. EF of 35 to 40%. Patient feeling great denies having any chest pain or shortness of breath. He does complain of some fatigue of muscles by the end of the day. Denies having dizziness lightheadedness or syncope. Tolerating his pills well. Still smoking half pack a day. Patient stable from my standpoint. Doing overall good    Past Medical History:   Diagnosis Date    Acute MI (CMS/[facility]) 10/28/2023    Angina pectoris (CMS/formerly [facility]) 10/28/2023    Coronary artery disease 09/28/2023    Essential hypertension 09/28/2023    Hyperlipidemia 09/28/2023    S/P cardiac cath     with [employer] [place] [facility] Eliting stent       Past Surgical History:   Procedure Laterality Date    CORONARY ANGIOPLASTY WITH STENT PLACEMENT  07/2023       No Known Allergies     reports that he has been smoking cigarettes. He has a 0.50 pack-year smoking history. He has been exposed to tobacco smoke. He has never used smokeless tobacco. He reports current alcohol use of about 1.0 standard drink of alcohol per week. He reports that he does not use drugs.    Family History   Problem Relation Name Age of Onset    Hypertension Mother      Heart disease Father      Heart attack Father         Patient's Medications   New Prescriptions    No medications on file   Previous Medications    ASPIRIN 81 MG EC TABLET    Take 1 tablet (81 mg) by mouth once daily.    ESOMEPRAZOLE (NEXIUM) 20 MG DR CAPSULE    Take 1 capsule (20 mg) by mouth once daily as needed. Do not open capsule.    LOSARTAN (COZAAR) 25 MG TABLET    Take 1 tablet (25 mg) by mouth once daily.    METOPROLOL TARTRATE (LOPRESSOR) 25 MG TABLET    Take 0.5 tablets (12.5 mg) by mouth 2 times a day.    NITROGLYCERIN (NITROSTAT) 0.4 MG SL TABLET    DISSOLVE 1 TABLET UNDER THE TONGUE EVERY 5 MINUTES UP TO  3 DOSES AS NEEDED FOR CHEST PAIN. IF STILL NO RELIEF, CALL 911    ROSUVASTATIN (CRESTOR) 20 MG TABLET    Take 1 tablet (20 mg) by mouth once daily.    TICAGRELOR (BRILINTA) 90 MG TABLET    Take 1 tablet (90 mg) by mouth 2 times a day.   Modified Medications    No medications on file   Discontinued Medications    METOPROLOL SUCCINATE XL (TOPROL-XL) 25 MG 24 HR TABLET    TAKE 1/2 (ONE-HALF) TABLET BY MOUTH TWICE DAILY. HOLD FOR HEART RATE LESS THAN 55BPM AND/OR SBP LESS THAN 90    OMEPRAZOLE (PRILOSEC) 40 MG DR CAPSULE    Take 1 capsule (40 mg) by mouth once daily as needed. Do not crush or chew.       Objective   Physical Exam  General: Patient in no acute distress   HEENT: Atraumatic normocephalic.  Neck: Supple, jugular venous pressure within normal limit.  No bruits  Lungs: Clear to auscultation bilaterally  Cardiovascular: Regular rate and rhythm, normal heart sounds, no murmurs rubs or gallops  Abdomen: Soft nontender nondistended.  Normal bowel sounds.  Extremities: Warm to touch, no edema.    Lab Review   No visits with results within 2 Month(s) from this visit.   Latest known visit with results is:   No results found for any previous visit.        Assessment/Plan   Patient Active Problem List   Diagnosis    Coronary artery disease    Essential hypertension    Hyperlipidemia    At risk for bleeding    Medical home patient    Angina pectoris (CMS/[facility])    Acute MI (CMS/[facility])    Anxiety    Depression    Disorder of synovium    Erectile dysfunction    Grief    Hypotestosteronism    Insomnia    Laceration of finger    Thumb anomaly    Vitamin D deficiency      This is a very pleasant 48-year-old male returns to my office after hospitalization for anterior STEMI status post PCI to % occluded with excellent result in August 2023. EF of 35 to 40%. Patient feeling great denies having any chest pain or shortness of breath. He does complain of some fatigue of muscles by the end of the day. Denies having dizziness  lightheadedness or syncope. Tolerating his pills well. Still smoking half pack a day. Patient stable from my standpoint. Doing overall good. I will continue up to medical therapy for heart failure systolic dysfunction as well as for coronary disease.  Patient did not have his echo done so we will obtain 2D echo to make sure that the muscle damage has improved.  We will also obtain lipid panel basic metabolic panel.  Otherwise he stable discussed with him lifestyle modification.  Will follow-up in 6 months    Virgilio Miner MD

## 2024-02-10 ENCOUNTER — LAB (OUTPATIENT)
Dept: LAB | Facility: LAB | Age: 49
End: 2024-02-10
Payer: COMMERCIAL

## 2024-02-10 DIAGNOSIS — I25.10 CORONARY ARTERY DISEASE INVOLVING NATIVE CORONARY ARTERY OF NATIVE HEART WITHOUT ANGINA PECTORIS: ICD-10-CM

## 2024-02-10 LAB
ANION GAP SERPL CALC-SCNC: 13 MMOL/L (ref 10–20)
BUN SERPL-MCNC: 11 MG/DL (ref 6–23)
CALCIUM SERPL-MCNC: 8.9 MG/DL (ref 8.6–10.3)
CHLORIDE SERPL-SCNC: 104 MMOL/L (ref 98–107)
CHOLEST SERPL-MCNC: 108 MG/DL (ref 0–199)
CHOLESTEROL/HDL RATIO: 2.7
CO2 SERPL-SCNC: 27 MMOL/L (ref 21–32)
CREAT SERPL-MCNC: 0.8 MG/DL (ref 0.5–1.3)
EGFRCR SERPLBLD CKD-EPI 2021: >90 ML/MIN/1.73M*2
GLUCOSE SERPL-MCNC: 97 MG/DL (ref 74–99)
HDLC SERPL-MCNC: 39.4 MG/DL
LDLC SERPL CALC-MCNC: 44 MG/DL
NON HDL CHOLESTEROL: 69 MG/DL (ref 0–149)
POTASSIUM SERPL-SCNC: 4.4 MMOL/L (ref 3.5–5.3)
SODIUM SERPL-SCNC: 140 MMOL/L (ref 136–145)
TRIGL SERPL-MCNC: 123 MG/DL (ref 0–149)
VLDL: 25 MG/DL (ref 0–40)

## 2024-02-10 PROCEDURE — 80061 LIPID PANEL: CPT

## 2024-02-10 PROCEDURE — 80048 BASIC METABOLIC PNL TOTAL CA: CPT

## 2024-02-10 PROCEDURE — 36415 COLL VENOUS BLD VENIPUNCTURE: CPT

## 2024-04-06 DIAGNOSIS — I10 PRIMARY HYPERTENSION: Primary | ICD-10-CM

## 2024-04-08 DIAGNOSIS — I21.9 ACUTE MYOCARDIAL INFARCTION, UNSPECIFIED MI TYPE, UNSPECIFIED ARTERY (MULTI): ICD-10-CM

## 2024-04-08 RX ORDER — METOPROLOL SUCCINATE 25 MG/1
12.5 TABLET, EXTENDED RELEASE ORAL 2 TIMES DAILY
Qty: 90 TABLET | Refills: 3 | Status: SHIPPED | OUTPATIENT
Start: 2024-04-08 | End: 2025-04-03

## 2024-04-08 NOTE — TELEPHONE ENCOUNTER
Patients pharmacy is requesting refill of the following medication(s) for a 90 day supply with refills.   Please send the attached prescription(s) as soon as possible.   Thank you.    Requested Prescriptions     Pending Prescriptions Disp Refills    aspirin 81 mg EC tablet 90 tablet 3     Sig: Take 1 tablet (81 mg) by mouth once daily.    ticagrelor (Brilinta) 90 mg tablet       Sig: Take 1 tablet (90 mg) by mouth 2 times a day.

## 2024-04-11 RX ORDER — ASPIRIN 81 MG/1
81 TABLET ORAL DAILY
Qty: 90 TABLET | Refills: 3 | Status: SHIPPED | OUTPATIENT
Start: 2024-04-11 | End: 2025-04-06

## 2024-06-30 ENCOUNTER — APPOINTMENT (OUTPATIENT)
Dept: CARDIOLOGY | Facility: HOSPITAL | Age: 49
End: 2024-06-30
Payer: COMMERCIAL

## 2024-06-30 ENCOUNTER — APPOINTMENT (OUTPATIENT)
Dept: RADIOLOGY | Facility: HOSPITAL | Age: 49
End: 2024-06-30
Payer: COMMERCIAL

## 2024-06-30 ENCOUNTER — HOSPITAL ENCOUNTER (EMERGENCY)
Facility: HOSPITAL | Age: 49
Discharge: HOME | End: 2024-06-30
Attending: STUDENT IN AN ORGANIZED HEALTH CARE EDUCATION/TRAINING PROGRAM
Payer: COMMERCIAL

## 2024-06-30 VITALS
SYSTOLIC BLOOD PRESSURE: 127 MMHG | OXYGEN SATURATION: 96 % | TEMPERATURE: 96.3 F | DIASTOLIC BLOOD PRESSURE: 82 MMHG | WEIGHT: 214 LBS | RESPIRATION RATE: 17 BRPM | HEART RATE: 61 BPM | BODY MASS INDEX: 35.65 KG/M2 | HEIGHT: 65 IN

## 2024-06-30 DIAGNOSIS — M79.18 MUSCULOSKELETAL PAIN: ICD-10-CM

## 2024-06-30 DIAGNOSIS — M89.8X1 PAIN OF RIGHT SCAPULA: Primary | ICD-10-CM

## 2024-06-30 LAB
ANION GAP SERPL CALC-SCNC: 10 MMOL/L
BASOPHILS # BLD AUTO: 0.03 X10*3/UL (ref 0–0.1)
BASOPHILS NFR BLD AUTO: 0.6 %
BUN SERPL-MCNC: 8 MG/DL (ref 8–25)
CALCIUM SERPL-MCNC: 9.2 MG/DL (ref 8.5–10.4)
CHLORIDE SERPL-SCNC: 108 MMOL/L (ref 97–107)
CO2 SERPL-SCNC: 24 MMOL/L (ref 24–31)
CREAT SERPL-MCNC: 0.8 MG/DL (ref 0.4–1.6)
EGFRCR SERPLBLD CKD-EPI 2021: >90 ML/MIN/1.73M*2
EOSINOPHIL # BLD AUTO: 0.13 X10*3/UL (ref 0–0.7)
EOSINOPHIL NFR BLD AUTO: 2.6 %
ERYTHROCYTE [DISTWIDTH] IN BLOOD BY AUTOMATED COUNT: 12.9 % (ref 11.5–14.5)
GLUCOSE SERPL-MCNC: 87 MG/DL (ref 65–99)
HCT VFR BLD AUTO: 49.2 % (ref 41–52)
HGB BLD-MCNC: 16.6 G/DL (ref 13.5–17.5)
IMM GRANULOCYTES # BLD AUTO: 0.01 X10*3/UL (ref 0–0.7)
IMM GRANULOCYTES NFR BLD AUTO: 0.2 % (ref 0–0.9)
LYMPHOCYTES # BLD AUTO: 1.86 X10*3/UL (ref 1.2–4.8)
LYMPHOCYTES NFR BLD AUTO: 36.8 %
MCH RBC QN AUTO: 31 PG (ref 26–34)
MCHC RBC AUTO-ENTMCNC: 33.7 G/DL (ref 32–36)
MCV RBC AUTO: 92 FL (ref 80–100)
MONOCYTES # BLD AUTO: 0.51 X10*3/UL (ref 0.1–1)
MONOCYTES NFR BLD AUTO: 10.1 %
NEUTROPHILS # BLD AUTO: 2.52 X10*3/UL (ref 1.2–7.7)
NEUTROPHILS NFR BLD AUTO: 49.7 %
NRBC BLD-RTO: 0 /100 WBCS (ref 0–0)
PLATELET # BLD AUTO: 226 X10*3/UL (ref 150–450)
POTASSIUM SERPL-SCNC: 4.5 MMOL/L (ref 3.4–5.1)
RBC # BLD AUTO: 5.36 X10*6/UL (ref 4.5–5.9)
SODIUM SERPL-SCNC: 142 MMOL/L (ref 133–145)
TROPONIN T SERPL-MCNC: <6 NG/L
TROPONIN T SERPL-MCNC: <6 NG/L
WBC # BLD AUTO: 5.1 X10*3/UL (ref 4.4–11.3)

## 2024-06-30 PROCEDURE — 71046 X-RAY EXAM CHEST 2 VIEWS: CPT | Performed by: RADIOLOGY

## 2024-06-30 PROCEDURE — 80048 BASIC METABOLIC PNL TOTAL CA: CPT | Performed by: STUDENT IN AN ORGANIZED HEALTH CARE EDUCATION/TRAINING PROGRAM

## 2024-06-30 PROCEDURE — 2500000001 HC RX 250 WO HCPCS SELF ADMINISTERED DRUGS (ALT 637 FOR MEDICARE OP)

## 2024-06-30 PROCEDURE — 99284 EMERGENCY DEPT VISIT MOD MDM: CPT | Mod: 25

## 2024-06-30 PROCEDURE — 84484 ASSAY OF TROPONIN QUANT: CPT | Performed by: STUDENT IN AN ORGANIZED HEALTH CARE EDUCATION/TRAINING PROGRAM

## 2024-06-30 PROCEDURE — 96374 THER/PROPH/DIAG INJ IV PUSH: CPT

## 2024-06-30 PROCEDURE — 71046 X-RAY EXAM CHEST 2 VIEWS: CPT

## 2024-06-30 PROCEDURE — 36415 COLL VENOUS BLD VENIPUNCTURE: CPT | Performed by: STUDENT IN AN ORGANIZED HEALTH CARE EDUCATION/TRAINING PROGRAM

## 2024-06-30 PROCEDURE — 2500000004 HC RX 250 GENERAL PHARMACY W/ HCPCS (ALT 636 FOR OP/ED): Performed by: STUDENT IN AN ORGANIZED HEALTH CARE EDUCATION/TRAINING PROGRAM

## 2024-06-30 PROCEDURE — 93005 ELECTROCARDIOGRAM TRACING: CPT

## 2024-06-30 PROCEDURE — 85025 COMPLETE CBC W/AUTO DIFF WBC: CPT | Performed by: STUDENT IN AN ORGANIZED HEALTH CARE EDUCATION/TRAINING PROGRAM

## 2024-06-30 RX ORDER — OXYCODONE AND ACETAMINOPHEN 5; 325 MG/1; MG/1
1 TABLET ORAL ONCE
Status: COMPLETED | OUTPATIENT
Start: 2024-06-30 | End: 2024-06-30

## 2024-06-30 RX ORDER — KETOROLAC TROMETHAMINE 30 MG/ML
15 INJECTION, SOLUTION INTRAMUSCULAR; INTRAVENOUS ONCE
Status: COMPLETED | OUTPATIENT
Start: 2024-06-30 | End: 2024-06-30

## 2024-06-30 RX ORDER — LIDOCAINE 50 MG/G
1 PATCH TOPICAL DAILY
Qty: 21 PATCH | Refills: 0 | Status: SHIPPED | OUTPATIENT
Start: 2024-06-30

## 2024-06-30 RX ORDER — PREDNISONE 10 MG/1
50 TABLET ORAL DAILY
Qty: 35 TABLET | Refills: 0 | Status: SHIPPED | OUTPATIENT
Start: 2024-06-30 | End: 2024-07-07

## 2024-06-30 RX ORDER — OXYCODONE AND ACETAMINOPHEN 5; 325 MG/1; MG/1
1 TABLET ORAL EVERY 6 HOURS PRN
Qty: 12 TABLET | Refills: 0 | Status: SHIPPED | OUTPATIENT
Start: 2024-06-30 | End: 2024-07-03

## 2024-06-30 RX ORDER — CYCLOBENZAPRINE HCL 10 MG
10 TABLET ORAL 2 TIMES DAILY PRN
Qty: 20 TABLET | Refills: 0 | Status: SHIPPED | OUTPATIENT
Start: 2024-06-30 | End: 2024-07-10

## 2024-06-30 ASSESSMENT — HEART SCORE
RISK FACTORS: >2 RISK FACTORS OR HX OF ATHEROSCLEROTIC DISEASE
ECG: NORMAL
AGE: 45-64
HEART SCORE: 3
TROPONIN: LESS THAN OR EQUAL TO NORMAL LIMIT
HISTORY: SLIGHTLY SUSPICIOUS

## 2024-06-30 ASSESSMENT — COLUMBIA-SUICIDE SEVERITY RATING SCALE - C-SSRS
6. HAVE YOU EVER DONE ANYTHING, STARTED TO DO ANYTHING, OR PREPARED TO DO ANYTHING TO END YOUR LIFE?: NO
1. IN THE PAST MONTH, HAVE YOU WISHED YOU WERE DEAD OR WISHED YOU COULD GO TO SLEEP AND NOT WAKE UP?: NO
2. HAVE YOU ACTUALLY HAD ANY THOUGHTS OF KILLING YOURSELF?: NO

## 2024-06-30 NOTE — DISCHARGE INSTRUCTIONS
Follow-up closely with your primary care provider in the following week.    Follow-up closely with your cardiologist.    Medications as prescribed.  Additionally can use Tylenol and ibuprofen to help with symptoms.

## 2024-06-30 NOTE — ED PROVIDER NOTES
HPI   Chief Complaint   Patient presents with    Numbness     6 days ago, patient had right scapula pain but since then has been getting worse and has now traveled down to the right arm. History of MI, Dr Guerrero sent in to get checked out.       Patient is a 48-year-old male presents emergency department for evaluation of right shoulder blade pain that radiates down his right arm.  Patient states that it started about 6 days ago feeling like a ball of tightness in his right scapula.  He states the pain initially was relieved by changes in position of the right arm, but states that now it is relatively persistent.  He describes it as a constant dull aching pain with numbness that radiates down the back of his right arm.  He is never had pain like this before.  He spoke with his cardiologist Dr. Miner who was concerned about his heart and sent him in for further evaluation.  He notes that a year ago he had a heart attack and had a stent placed with history of high blood pressure and high cholesterol.  He states that there were a few weeks where he was not taking his medications to do the fact that he was very busy with work and other things, but states that he has been taking them over the last several weeks. He has no associated chest pain, lightheadedness, dizziness, nausea, vomiting, fevers, chills, shortness of breath.      History provided by:  Patient   used: No                        [place] Coma Scale Score: 15                     Patient History   Past Medical History:   Diagnosis Date    Acute MI (Multi) 10/28/2023    Angina pectoris (CMS-HCC) 10/28/2023    Coronary artery disease 09/28/2023    Essential hypertension 09/28/2023    Hyperlipidemia 09/28/2023    S/P cardiac cath     with [employer] [place] [facility] Eliting stent     Past Surgical History:   Procedure Laterality Date    CORONARY ANGIOPLASTY WITH STENT PLACEMENT  07/2023     Family History   Problem Relation Name Age of  Onset    Hypertension Mother      Heart disease Father      Heart attack Father       Social History     Tobacco Use    Smoking status: Every Day     Current packs/day: 0.50     Average packs/day: 0.5 packs/day for 1 year (0.5 ttl pk-yrs)     Types: Cigarettes     Passive exposure: Past    Smokeless tobacco: Never   Substance Use Topics    Alcohol use: Yes     Alcohol/week: 1.0 standard drink of alcohol     Types: 1 Standard drinks or equivalent per week     Comment: rarely consumes alcohol    Drug use: Never       Physical Exam   ED Triage Vitals [06/30/24 0812]   Temperature Heart Rate Respirations BP   36.6 °C (97.9 °F) 64 18 (!) 143/91      Pulse Ox Temp src Heart Rate Source Patient Position   100 % -- -- --      BP Location FiO2 (%)     -- --       Physical Exam  Constitutional:       Appearance: Normal appearance.   Cardiovascular:      Rate and Rhythm: Normal rate and regular rhythm.   Pulmonary:      Effort: Pulmonary effort is normal.      Breath sounds: Normal breath sounds.   Musculoskeletal:         General: Normal range of motion.      Comments: No significant tenderness palpation over right scapula with good strength in bilateral upper extremities.   Skin:     General: Skin is warm and dry.   Neurological:      General: No focal deficit present.      Mental Status: He is alert and oriented to person, place, and time.         ED Course & MDM   ED Course as of 06/30/24 1218   Sun Jun 30, 2024   0858 EKG interpretation: Normal sinus rhythm, rate 62.  Normal axis.  No ST or T wave abnormalities. [ML]      ED Course User Index  [ML] Miguel Coyle MD         Diagnoses as of 06/30/24 1218   Pain of right scapula   Musculoskeletal pain       Medical Decision Making  Patient is a 48-year-old male presents emergency department for evaluation of right scapula and arm pain starting 6 days ago and coming relatively persistent.    EKG was interpreted by attending physician and reviewed by me.    Lab work done  today included BMP, CBC, troponins.  Lab work without acute abnormality with troponins within normal range both less than 6.    Scans done today were interpreted/confirmed by radiologist and also interpreted by me which included chest x-ray.  Chest x-ray shows no acute cardiopulmonary process.    Medications given at today's visit include PO prednisone, IV Toradol, PO Percocet    I saw this patient in conjunction with Dr. Coyle.  Patient shahbaz stable in the emergency department.  He did not develop chest pain at any time and all of his pain remained in the right scapular region.  EKG shows no evidence of ischemia and troponins within normal range.  Chest x-ray shows no acute cardiopulmonary process.  Patient's pain likely musculoskeletal in origin especially given distribution, with cardiac workup negative.  Patient has heart score of 3 given low risk for major cardiac event stable to be discharged follow-up closely outpatient with cardiology.  Otherwise patient was educated on continued symptom management will be prescribed steroids to help with symptoms along with muscle relaxer and short course of pain medicine.  Patient agreeable to plan and discharge at this time.  Emergent pathologies were considered for this patient, although I have low suspicion for anything acutely emergent given patient's clinical presentation, history, physical exam, stable vital signs, and relatively unremarkable workup.  Discharging patient home is reasonable plan of care for outpatient management.    All labs, imaging, and diagnostic studies were reviewed by me and patient was counseled on clinical impression, expectations, and plan.  Patient was educated to follow-up with PCP in the following 1-2 days.  All questions from patient were answered. They elicited understanding and were agreeable to course of treatment.  Patient was discharged in stable condition and given strict return precautions.    Prescriptions given on discharge: PO  Percocet, prednisone, topical Lidoderm patch    ** Disclaimer:  Parts of this document were written utilizing a voice to text dictation software.  Note may contain minor transcription or typographical errors that were inadvertently transcribed by the computer software.        Procedure  Procedures     Cynthia Marshall PA-C  06/30/24 1222

## 2024-07-01 LAB
ATRIAL RATE: 100 BPM
P OFFSET: 188 MS
P ONSET: 170 MS
Q ONSET: 247 MS
QRS COUNT: 22 BEATS
QRS DURATION: 4 MS
QT INTERVAL: 166 MS
QTC CALCULATION(BAZETT): 250 MS
QTC FREDERICIA: 218 MS
R AXIS: 0 DEGREES
T AXIS: 223 DEGREES
T OFFSET: 330 MS
VENTRICULAR RATE: 137 BPM

## 2024-07-16 ENCOUNTER — OFFICE VISIT (OUTPATIENT)
Dept: PRIMARY CARE | Facility: CLINIC | Age: 49
End: 2024-07-16
Payer: COMMERCIAL

## 2024-07-16 VITALS
BODY MASS INDEX: 35.49 KG/M2 | HEART RATE: 76 BPM | DIASTOLIC BLOOD PRESSURE: 70 MMHG | RESPIRATION RATE: 20 BRPM | HEIGHT: 65 IN | SYSTOLIC BLOOD PRESSURE: 118 MMHG | OXYGEN SATURATION: 98 % | TEMPERATURE: 98.8 F | WEIGHT: 213 LBS

## 2024-07-16 DIAGNOSIS — M25.511 SUBSCAPULAR PAIN, RIGHT: Primary | ICD-10-CM

## 2024-07-16 PROCEDURE — 3074F SYST BP LT 130 MM HG: CPT | Performed by: FAMILY MEDICINE

## 2024-07-16 PROCEDURE — 3078F DIAST BP <80 MM HG: CPT | Performed by: FAMILY MEDICINE

## 2024-07-16 PROCEDURE — 99213 OFFICE O/P EST LOW 20 MIN: CPT | Performed by: FAMILY MEDICINE

## 2024-07-16 ASSESSMENT — PAIN SCALES - GENERAL: PAINLEVEL: 3

## 2024-07-16 NOTE — PROGRESS NOTES
"Subjective   Patient ID: Henrry Burdick is a 48 y.o. male who presents for Shoulder Pain (PATIENT HERE FOR ER FOLLOW FOR RIGHT SHOULDER PAIN.  HE WENT TO [facility], [place], operated by [facility] LAST WEEKEND WAS GIVEN STEROIDS AND THE PAIN IS NO BETTER).    Shoulder Pain      ER notes reviewed.  Pt went to ER on 6/30,  he has hx of stent and was referred to ER by cardiology due to scapular pain.  Labs in er wnl.  Cxr wnl.  Given steroid and released.    Pt states his mother in law had cataract surgery and had to sleep on her couch and he slept with right arm out in the air and since then he has had right lower parascapular pain.  This was about 4 wks ago.    Review of Systems see HPI    Objective   /70 (BP Location: Right arm, Patient Position: Sitting, BP Cuff Size: Adult)   Pulse 76   Temp 37.1 °C (98.8 °F) (Skin)   Resp 20   Ht 1.651 m (5' 5\")   Wt 96.6 kg (213 lb)   SpO2 98%   BMI 35.45 kg/m²     Physical Exam  Constitutional:       General: He is not in acute distress.     Appearance: Normal appearance.   Cardiovascular:      Rate and Rhythm: Normal rate and regular rhythm.      Heart sounds: No murmur heard.  Pulmonary:      Breath sounds: Normal breath sounds. No wheezing.   Musculoskeletal:      Comments: Some right lower subscapular pain/parascapular pain with resisted abduction and adduction of shoulder.  NT shoulder jt.    Arm drom neg.  No paracervical pain.    Neurological:      Mental Status: He is alert.         Assessment/Plan   Problem List Items Addressed This Visit    None  Visit Diagnoses         Codes    Subscapular pain, right    -  Primary M25.511    Relevant Orders    Referral to Physical Therapy        Follow up post PT if pain persists.        "

## 2024-07-23 ENCOUNTER — APPOINTMENT (OUTPATIENT)
Dept: CARDIOLOGY | Facility: CLINIC | Age: 49
End: 2024-07-23
Payer: COMMERCIAL

## 2024-07-23 VITALS
HEART RATE: 77 BPM | SYSTOLIC BLOOD PRESSURE: 124 MMHG | RESPIRATION RATE: 18 BRPM | WEIGHT: 213 LBS | TEMPERATURE: 98.6 F | HEIGHT: 65 IN | BODY MASS INDEX: 35.49 KG/M2 | OXYGEN SATURATION: 96 % | DIASTOLIC BLOOD PRESSURE: 68 MMHG

## 2024-07-23 DIAGNOSIS — I20.9 ANGINA PECTORIS (CMS-HCC): ICD-10-CM

## 2024-07-23 DIAGNOSIS — I25.10 CORONARY ARTERY DISEASE INVOLVING NATIVE CORONARY ARTERY OF NATIVE HEART WITHOUT ANGINA PECTORIS: ICD-10-CM

## 2024-07-23 DIAGNOSIS — E78.2 MIXED HYPERLIPIDEMIA: Primary | ICD-10-CM

## 2024-07-23 DIAGNOSIS — I10 ESSENTIAL HYPERTENSION: ICD-10-CM

## 2024-07-23 PROCEDURE — 99214 OFFICE O/P EST MOD 30 MIN: CPT | Performed by: INTERNAL MEDICINE

## 2024-07-23 PROCEDURE — 3008F BODY MASS INDEX DOCD: CPT | Performed by: INTERNAL MEDICINE

## 2024-07-23 PROCEDURE — 3078F DIAST BP <80 MM HG: CPT | Performed by: INTERNAL MEDICINE

## 2024-07-23 PROCEDURE — 3074F SYST BP LT 130 MM HG: CPT | Performed by: INTERNAL MEDICINE

## 2024-07-23 ASSESSMENT — LIFESTYLE VARIABLES
AUDIT-C TOTAL SCORE: 2
HAS A RELATIVE, FRIEND, DOCTOR, OR ANOTHER HEALTH PROFESSIONAL EXPRESSED CONCERN ABOUT YOUR DRINKING OR SUGGESTED YOU CUT DOWN: NO
HOW OFTEN DURING THE LAST YEAR HAVE YOU FOUND THAT YOU WERE NOT ABLE TO STOP DRINKING ONCE YOU HAD STARTED: NEVER
HOW OFTEN DO YOU HAVE A DRINK CONTAINING ALCOHOL: 2-4 TIMES A MONTH
SKIP TO QUESTIONS 9-10: 1
HOW OFTEN DURING THE LAST YEAR HAVE YOU FAILED TO DO WHAT WAS NORMALLY EXPECTED FROM YOU BECAUSE OF DRINKING: NEVER
HOW OFTEN DURING THE LAST YEAR HAVE YOU BEEN UNABLE TO REMEMBER WHAT HAPPENED THE NIGHT BEFORE BECAUSE YOU HAD BEEN DRINKING: NEVER
HOW MANY STANDARD DRINKS CONTAINING ALCOHOL DO YOU HAVE ON A TYPICAL DAY: 1 OR 2
HAVE YOU OR SOMEONE ELSE BEEN INJURED AS A RESULT OF YOUR DRINKING: NO
AUDIT TOTAL SCORE: 2
HOW OFTEN DURING THE LAST YEAR HAVE YOU HAD A FEELING OF GUILT OR REMORSE AFTER DRINKING: NEVER
HOW OFTEN DO YOU HAVE SIX OR MORE DRINKS ON ONE OCCASION: NEVER
HOW OFTEN DURING THE LAST YEAR HAVE YOU NEEDED AN ALCOHOLIC DRINK FIRST THING IN THE MORNING TO GET YOURSELF GOING AFTER A NIGHT OF HEAVY DRINKING: NEVER

## 2024-07-23 ASSESSMENT — ENCOUNTER SYMPTOMS
OCCASIONAL FEELINGS OF UNSTEADINESS: 0
DEPRESSION: 0
LOSS OF SENSATION IN FEET: 0

## 2024-07-23 ASSESSMENT — PATIENT HEALTH QUESTIONNAIRE - PHQ9
2. FEELING DOWN, DEPRESSED OR HOPELESS: SEVERAL DAYS
1. LITTLE INTEREST OR PLEASURE IN DOING THINGS: NOT AT ALL
10. IF YOU CHECKED OFF ANY PROBLEMS, HOW DIFFICULT HAVE THESE PROBLEMS MADE IT FOR YOU TO DO YOUR WORK, TAKE CARE OF THINGS AT HOME, OR GET ALONG WITH OTHER PEOPLE: NOT DIFFICULT AT ALL
SUM OF ALL RESPONSES TO PHQ9 QUESTIONS 1 AND 2: 1

## 2024-07-23 ASSESSMENT — PAIN SCALES - GENERAL: PAINLEVEL: 0-NO PAIN

## 2024-07-23 NOTE — PROGRESS NOTES
History of present illness:  This is a very pleasant 49-year-old male returns to my office after hospitalization for anterior STEMI status post PCI to % occluded with excellent result in August 2023. EF of 35 to 40%.  Patient returns to my office for follow-up.  Feeling overall good.  Nuys any chest pain shortness of breath palpitations dizziness or lightheadedness.        Past Medical History:   Diagnosis Date    Acute MI (Multi) 10/28/2023    Angina pectoris (CMS-HCC) 10/28/2023    Coronary artery disease 09/28/2023    Essential hypertension 09/28/2023    Hyperlipidemia 09/28/2023    S/P cardiac cath     with [employer] [facility] Eliting stent       Past Surgical History:   Procedure Laterality Date    CORONARY ANGIOPLASTY WITH STENT PLACEMENT  07/2023       No Known Allergies     reports that he has been smoking cigarettes. He has a 0.5 pack-year smoking history. He has been exposed to tobacco smoke. He has never used smokeless tobacco. He reports current alcohol use of about 1.0 standard drink of alcohol per week. He reports that he does not use drugs.    Family History   Problem Relation Name Age of Onset    Hypertension Mother      Heart disease Father      Heart attack Father         Patient's Medications   New Prescriptions    No medications on file   Previous Medications    ASPIRIN 81 MG EC TABLET    Take 1 tablet (81 mg) by mouth once daily.    CYCLOBENZAPRINE (FLEXERIL) 10 MG TABLET    Take 1 tablet (10 mg) by mouth 2 times a day as needed for muscle spasms for up to 10 days.    ESOMEPRAZOLE (NEXIUM) 20 MG DR CAPSULE    Take 1 capsule (20 mg) by mouth once daily as needed. Do not open capsule.    LOSARTAN (COZAAR) 25 MG TABLET    Take 1 tablet (25 mg) by mouth once daily.    METOPROLOL SUCCINATE XL (TOPROL-XL) 25 MG 24 HR TABLET    Take 0.5 tablets (12.5 mg) by mouth 2 times a day.    METOPROLOL TARTRATE (LOPRESSOR) 25 MG TABLET    Take 0.5 tablets (12.5 mg) by mouth 2 times a day.     NITROGLYCERIN (NITROSTAT) 0.4 MG SL TABLET    DISSOLVE 1 TABLET UNDER THE TONGUE EVERY 5 MINUTES UP TO 3 DOSES AS NEEDED FOR CHEST PAIN. IF STILL NO RELIEF, CALL 911    ROSUVASTATIN (CRESTOR) 20 MG TABLET    Take 1 tablet (20 mg) by mouth once daily.    TICAGRELOR (BRILINTA) 90 MG TABLET    Take 1 tablet (90 mg) by mouth 2 times a day.   Modified Medications    No medications on file   Discontinued Medications    No medications on file       Objective   Physical Exam  General: Patient in no acute distress   HEENT: Atraumatic normocephalic.  Neck: Supple, jugular venous pressure within normal limit.  No bruits  Lungs: Clear to auscultation bilaterally  Cardiovascular: Regular rate and rhythm, normal heart sounds, no murmurs rubs or gallops  Abdomen: Soft nontender nondistended.  Normal bowel sounds.  Extremities: Warm to touch, no edema.      Lab Review   Admission on 06/30/2024, Discharged on 06/30/2024   Component Date Value    WBC 06/30/2024 5.1     nRBC 06/30/2024 0.0     RBC 06/30/2024 5.36     Hemoglobin 06/30/2024 16.6     Hematocrit 06/30/2024 49.2     MCV 06/30/2024 92     MCH 06/30/2024 31.0     MCHC 06/30/2024 33.7     RDW 06/30/2024 12.9     Platelets 06/30/2024 226     Neutrophils % 06/30/2024 49.7     Immature Granulocytes %,* 06/30/2024 0.2     Lymphocytes % 06/30/2024 36.8     Monocytes % 06/30/2024 10.1     Eosinophils % 06/30/2024 2.6     Basophils % 06/30/2024 0.6     Neutrophils Absolute 06/30/2024 2.52     Immature Granulocytes Ab* 06/30/2024 0.01     Lymphocytes Absolute 06/30/2024 1.86     Monocytes Absolute 06/30/2024 0.51     Eosinophils Absolute 06/30/2024 0.13     Basophils Absolute 06/30/2024 0.03     Glucose 06/30/2024 87     Sodium 06/30/2024 142     Potassium 06/30/2024 4.5     Chloride 06/30/2024 108 (H)     Bicarbonate 06/30/2024 24     Urea Nitrogen 06/30/2024 8     Creatinine 06/30/2024 0.80     eGFR 06/30/2024 >90     Calcium 06/30/2024 9.2     Anion Gap 06/30/2024 10      Ventricular Rate 06/30/2024 137     Atrial Rate 06/30/2024 100     QRS Duration 06/30/2024 4     QT Interval 06/30/2024 166     QTC Calculation(Bazett) 06/30/2024 250     R Axis 06/30/2024 0     T Axis 06/30/2024 223     QRS Count 06/30/2024 22     Q Onset 06/30/2024 247     P Onset 06/30/2024 170     P Offset 06/30/2024 188     T Offset 06/30/2024 330     QTC Fredericia 06/30/2024 218     Troponin T, High Sensiti* 06/30/2024 <6     Troponin T, High Sensiti* 06/30/2024 <6         Assessment/Plan   Patient Active Problem List   Diagnosis    Coronary artery disease    Essential hypertension    Hyperlipidemia    At risk for bleeding    Medical home patient    Angina pectoris (CMS-HCC)    Acute MI (Multi)    Anxiety    Depression    Disorder of synovium    Erectile dysfunction    Grief    Hypotestosteronism    Insomnia    Laceration of finger    Thumb anomaly    Vitamin D deficiency      This is a very pleasant 49-year-old male returns to my office after hospitalization for anterior STEMI status post PCI to % occluded with excellent result in August 2023. EF of 35 to 40%.  Patient returns to my office for follow-up.  Feeling overall good.  Nuys any chest pain shortness of breath palpitations dizziness or lightheadedness.  Tolerating his pills well.  Blood pressure and heart rate well-controlled.  LDL at target.  Did not do repeated echo from February.  I do recommend him to have another echo done to assess his ejection fraction and make sure that the anterior wall has healed after intervention.  Otherwise he is doing good from my standpoint we will follow-up in 6 months.  Will prolong his dual antiplatelet therapy for total of 18 months I will stop his Brilinta when I see him next.  Discussed with him lifestyle modification smoking cessation.      Virgilio Miner MD

## 2024-12-20 DIAGNOSIS — E78.2 MIXED HYPERLIPIDEMIA: ICD-10-CM

## 2024-12-20 DIAGNOSIS — I20.9 ANGINA PECTORIS: ICD-10-CM

## 2024-12-20 RX ORDER — ROSUVASTATIN CALCIUM 20 MG/1
20 TABLET, COATED ORAL DAILY
Qty: 90 TABLET | Refills: 3 | Status: SHIPPED | OUTPATIENT
Start: 2024-12-20 | End: 2025-12-15

## 2024-12-20 RX ORDER — LOSARTAN POTASSIUM 25 MG/1
25 TABLET ORAL DAILY
Qty: 90 TABLET | Refills: 3 | Status: SHIPPED | OUTPATIENT
Start: 2024-12-20 | End: 2025-12-15

## 2025-02-11 ENCOUNTER — HOSPITAL ENCOUNTER (OUTPATIENT)
Dept: CARDIOLOGY | Facility: HOSPITAL | Age: 50
Discharge: HOME | End: 2025-02-11
Payer: COMMERCIAL

## 2025-02-11 DIAGNOSIS — I25.10 CORONARY ARTERY DISEASE INVOLVING NATIVE CORONARY ARTERY OF NATIVE HEART WITHOUT ANGINA PECTORIS: ICD-10-CM

## 2025-02-11 PROCEDURE — 93306 TTE W/DOPPLER COMPLETE: CPT | Performed by: INTERNAL MEDICINE

## 2025-02-11 PROCEDURE — 93306 TTE W/DOPPLER COMPLETE: CPT

## 2025-02-13 LAB
AORTIC VALVE MEAN GRADIENT: 3 MMHG
AORTIC VALVE PEAK VELOCITY: 1.15 M/S
AV PEAK GRADIENT: 5 MMHG
AVA (PEAK VEL): 2.95 CM2
AVA (VTI): 3.2 CM2
EJECTION FRACTION APICAL 4 CHAMBER: 55.8
EJECTION FRACTION: 58 %
LEFT ATRIUM VOLUME AREA LENGTH INDEX BSA: 12.4 ML/M2
LEFT VENTRICLE INTERNAL DIMENSION DIASTOLE: 4.1 CM (ref 3.5–6)
LEFT VENTRICULAR OUTFLOW TRACT DIAMETER: 2.3 CM
LV EJECTION FRACTION BIPLANE: 55 %
MITRAL VALVE E/A RATIO: 1.36
RIGHT VENTRICLE FREE WALL PEAK S': 14.3 CM/S
TRICUSPID ANNULAR PLANE SYSTOLIC EXCURSION: 2.6 CM

## 2025-02-25 ENCOUNTER — APPOINTMENT (OUTPATIENT)
Dept: CARDIOLOGY | Facility: CLINIC | Age: 50
End: 2025-02-25
Payer: COMMERCIAL

## 2025-02-25 VITALS
HEART RATE: 68 BPM | WEIGHT: 210 LBS | DIASTOLIC BLOOD PRESSURE: 64 MMHG | OXYGEN SATURATION: 96 % | SYSTOLIC BLOOD PRESSURE: 116 MMHG | BODY MASS INDEX: 34.95 KG/M2 | RESPIRATION RATE: 16 BRPM

## 2025-02-25 DIAGNOSIS — I25.10 CORONARY ARTERY DISEASE INVOLVING NATIVE CORONARY ARTERY OF NATIVE HEART WITHOUT ANGINA PECTORIS: ICD-10-CM

## 2025-02-25 DIAGNOSIS — E78.00 PURE HYPERCHOLESTEROLEMIA: ICD-10-CM

## 2025-02-25 DIAGNOSIS — I10 ESSENTIAL HYPERTENSION: ICD-10-CM

## 2025-02-25 DIAGNOSIS — I20.9 ANGINA PECTORIS: Primary | ICD-10-CM

## 2025-02-25 PROCEDURE — 99214 OFFICE O/P EST MOD 30 MIN: CPT | Performed by: INTERNAL MEDICINE

## 2025-02-25 PROCEDURE — 3078F DIAST BP <80 MM HG: CPT | Performed by: INTERNAL MEDICINE

## 2025-02-25 PROCEDURE — 3074F SYST BP LT 130 MM HG: CPT | Performed by: INTERNAL MEDICINE

## 2025-02-25 ASSESSMENT — LIFESTYLE VARIABLES
AUDIT TOTAL SCORE: 1
HAS A RELATIVE, FRIEND, DOCTOR, OR ANOTHER HEALTH PROFESSIONAL EXPRESSED CONCERN ABOUT YOUR DRINKING OR SUGGESTED YOU CUT DOWN: NO
HOW OFTEN DO YOU HAVE SIX OR MORE DRINKS ON ONE OCCASION: NEVER
SKIP TO QUESTIONS 9-10: 1
AUDIT-C TOTAL SCORE: 1
HOW OFTEN DO YOU HAVE A DRINK CONTAINING ALCOHOL: MONTHLY OR LESS
HAVE YOU OR SOMEONE ELSE BEEN INJURED AS A RESULT OF YOUR DRINKING: NO
HOW MANY STANDARD DRINKS CONTAINING ALCOHOL DO YOU HAVE ON A TYPICAL DAY: 1 OR 2

## 2025-02-25 ASSESSMENT — ENCOUNTER SYMPTOMS
OCCASIONAL FEELINGS OF UNSTEADINESS: 0
LOSS OF SENSATION IN FEET: 0
DEPRESSION: 0

## 2025-02-25 ASSESSMENT — PAIN SCALES - GENERAL: PAINLEVEL_OUTOF10: 0-NO PAIN

## 2025-02-25 ASSESSMENT — PATIENT HEALTH QUESTIONNAIRE - PHQ9
SUM OF ALL RESPONSES TO PHQ9 QUESTIONS 1 AND 2: 0
2. FEELING DOWN, DEPRESSED OR HOPELESS: NOT AT ALL
1. LITTLE INTEREST OR PLEASURE IN DOING THINGS: NOT AT ALL

## 2025-02-25 NOTE — PROGRESS NOTES
History of present illness:  This is a very pleasant 49-year-old male with history of anterior STEMI status post PCI to % occluded with excellent result in August 2023. EF of 35 to 40%.  Repeated echo in February 11, 2025 showed normal ejection fraction no major valve abnormalities.  Patient returns to my office for follow-up.  Feeling overall good.  Nuys any chest pain or shortness of breath.  No palpitations dizziness lightheadedness or syncope.  However he has been having episodes of pain of the right arm radiating to right nipple with cough for a month or so.    Past Medical History:   Diagnosis Date    Acute MI (Multi) 10/28/2023    Angina pectoris 10/28/2023    Coronary artery disease 09/28/2023    Essential hypertension 09/28/2023    Hyperlipidemia 09/28/2023    S/P cardiac cath     with [employer] [facility] Eliting stent       Past Surgical History:   Procedure Laterality Date    CORONARY ANGIOPLASTY WITH STENT PLACEMENT  07/2023       No Known Allergies     reports that he has been smoking cigarettes. He has a 0.5 pack-year smoking history. He has been exposed to tobacco smoke. He has never used smokeless tobacco. He reports current alcohol use of about 1.0 standard drink of alcohol per week. He reports that he does not use drugs.    Family History   Problem Relation Name Age of Onset    Hypertension Mother      Heart disease Father      Heart attack Father         Patient's Medications   New Prescriptions    No medications on file   Previous Medications    ASPIRIN 81 MG EC TABLET    Take 1 tablet (81 mg) by mouth once daily.    ESOMEPRAZOLE (NEXIUM) 20 MG DR CAPSULE    Take 1 capsule (20 mg) by mouth once daily as needed. Do not open capsule.    LOSARTAN (COZAAR) 25 MG TABLET    Take 1 tablet (25 mg) by mouth once daily.    METOPROLOL SUCCINATE XL (TOPROL-XL) 25 MG 24 HR TABLET    Take 0.5 tablets (12.5 mg) by mouth 2 times a day.    NITROGLYCERIN (NITROSTAT) 0.4 MG SL TABLET    DISSOLVE 1  TABLET UNDER THE TONGUE EVERY 5 MINUTES UP TO 3 DOSES AS NEEDED FOR CHEST PAIN. IF STILL NO RELIEF, CALL 911    ROSUVASTATIN (CRESTOR) 20 MG TABLET    Take 1 tablet (20 mg) by mouth once daily.    TICAGRELOR (BRILINTA) 90 MG TABLET    Take 1 tablet (90 mg) by mouth 2 times a day.   Modified Medications    No medications on file   Discontinued Medications    No medications on file       Objective   Physical Exam  General: Patient in no acute distress   HEENT: Atraumatic normocephalic.  Neck: Supple, jugular venous pressure within normal limit.  No bruits  Lungs: Clear to auscultation bilaterally  Cardiovascular: Regular rate and rhythm, normal heart sounds, no murmurs rubs or gallops  Abdomen: Soft nontender nondistended.  Normal bowel sounds.  Extremities: Warm to touch, no edema.      Lab Review   Hospital Outpatient Visit on 02/11/2025   Component Date Value    AV pk sierra 02/11/2025 1.15     LVOT diam 02/11/2025 2.30     AV mn grad 02/11/2025 3     MV E/A ratio 02/11/2025 1.36     Tricuspid annular plane * 02/11/2025 2.6     LV Biplane EF 02/11/2025 55     LA vol index A/L 02/11/2025 12.4     LV EF 02/11/2025 58     RV free wall pk S' 02/11/2025 14.30     LVIDd 02/11/2025 4.10     AV pk grad 02/11/2025 5     Aortic Valve Area by Con* 02/11/2025 3.20     Aortic Valve Area by Con* 02/11/2025 2.95     LV A4C EF 02/11/2025 55.8         Assessment/Plan   Patient Active Problem List   Diagnosis    Coronary artery disease    Essential hypertension    Hyperlipidemia    At risk for bleeding    Medical home patient    Angina pectoris    Acute MI (Multi)    Anxiety    Depression    Disorder of synovium    Erectile dysfunction    Grief    Hypotestosteronism    Insomnia    Laceration of finger    Thumb anomaly    Vitamin D deficiency      This is a very pleasant 49-year-old male with history of anterior STEMI status post PCI to % occluded with excellent result in August 2023. EF of 35 to 40%.  Repeated echo in February  11, 2025 showed normal ejection fraction no major valve abnormalities.  Patient returns to my office for follow-up.  Feeling overall good.  Nuys any chest pain or shortness of breath.  No palpitations dizziness lightheadedness or syncope.  However he has been having episodes of pain of the right arm radiating to right nipple with cough for a month or so.    At this point offered patient chest CT without contrast with this atypical musculoskeletal pain of the chest he would like to hold for now.  If he continues to have this chest pain for months we will order chest CT without contrast to make sure there is no structural or anatomical issues in his chest wall or lungs that is causing these chest pains.  Blood pressure and heart rate well-controlled.  Has been on dual antiplatelet therapy for 18 months after his PCI.  May stop Brilinta.  Continue aspirin.  LDL at target.  Discussed with him smoking cessation.  Will follow-up in 6 months.    Virgilio Miner MD

## 2025-04-23 DIAGNOSIS — I21.9 ACUTE MYOCARDIAL INFARCTION, UNSPECIFIED MI TYPE, UNSPECIFIED ARTERY (MULTI): ICD-10-CM

## 2025-04-23 DIAGNOSIS — I10 PRIMARY HYPERTENSION: ICD-10-CM

## 2025-04-24 RX ORDER — ASPIRIN 81 MG/1
81 TABLET ORAL DAILY
Qty: 90 TABLET | Refills: 3 | Status: SHIPPED | OUTPATIENT
Start: 2025-04-24 | End: 2026-04-19

## 2025-04-24 RX ORDER — METOPROLOL SUCCINATE 25 MG/1
25 TABLET, EXTENDED RELEASE ORAL 2 TIMES DAILY
Qty: 180 TABLET | Refills: 3 | Status: SHIPPED | OUTPATIENT
Start: 2025-04-24 | End: 2026-04-19

## 2025-08-26 ENCOUNTER — APPOINTMENT (OUTPATIENT)
Dept: CARDIOLOGY | Facility: CLINIC | Age: 50
End: 2025-08-26
Payer: COMMERCIAL